# Patient Record
Sex: FEMALE | Race: WHITE | NOT HISPANIC OR LATINO | Employment: OTHER | URBAN - METROPOLITAN AREA
[De-identification: names, ages, dates, MRNs, and addresses within clinical notes are randomized per-mention and may not be internally consistent; named-entity substitution may affect disease eponyms.]

---

## 2018-08-24 ENCOUNTER — OFFICE VISIT (OUTPATIENT)
Dept: UROLOGY | Facility: CLINIC | Age: 83
End: 2018-08-24
Payer: COMMERCIAL

## 2018-08-24 VITALS
BODY MASS INDEX: 31.96 KG/M2 | HEIGHT: 65 IN | HEART RATE: 59 BPM | DIASTOLIC BLOOD PRESSURE: 72 MMHG | SYSTOLIC BLOOD PRESSURE: 112 MMHG | WEIGHT: 191.8 LBS

## 2018-08-24 DIAGNOSIS — R31.9 URINARY TRACT INFECTION WITH HEMATURIA, SITE UNSPECIFIED: Primary | ICD-10-CM

## 2018-08-24 DIAGNOSIS — N39.0 URINARY TRACT INFECTION WITH HEMATURIA, SITE UNSPECIFIED: Primary | ICD-10-CM

## 2018-08-24 LAB
SL AMB  POCT GLUCOSE, UA: NORMAL
SL AMB LEUKOCYTE ESTERASE,UA: NORMAL
SL AMB POCT BILIRUBIN,UA: NORMAL
SL AMB POCT BLOOD,UA: 50
SL AMB POCT CLARITY,UA: CLEAR
SL AMB POCT COLOR,UA: YELLOW
SL AMB POCT KETONES,UA: NORMAL
SL AMB POCT NITRITE,UA: NORMAL
SL AMB POCT PH,UA: 8
SL AMB POCT SPECIFIC GRAVITY,UA: 1
SL AMB POCT URINE PROTEIN: NORMAL
SL AMB POCT UROBILINOGEN: NORMAL

## 2018-08-24 PROCEDURE — 87147 CULTURE TYPE IMMUNOLOGIC: CPT | Performed by: UROLOGY

## 2018-08-24 PROCEDURE — 87086 URINE CULTURE/COLONY COUNT: CPT | Performed by: UROLOGY

## 2018-08-24 PROCEDURE — 81002 URINALYSIS NONAUTO W/O SCOPE: CPT | Performed by: UROLOGY

## 2018-08-24 PROCEDURE — 99203 OFFICE O/P NEW LOW 30 MIN: CPT | Performed by: UROLOGY

## 2018-08-24 RX ORDER — MIRTAZAPINE 15 MG/1
7.5 TABLET, FILM COATED ORAL
COMMUNITY
Start: 2018-07-14 | End: 2019-07-14

## 2018-08-24 RX ORDER — DORZOLAMIDE HYDROCHLORIDE AND TIMOLOL MALEATE 20; 5 MG/ML; MG/ML
SOLUTION/ DROPS OPHTHALMIC
COMMUNITY
Start: 2017-11-22

## 2018-08-24 RX ORDER — ZOLPIDEM TARTRATE 5 MG/1
TABLET ORAL
COMMUNITY
Start: 2018-06-27

## 2018-08-24 RX ORDER — BENAZEPRIL HYDROCHLORIDE 10 MG/1
TABLET ORAL
COMMUNITY
Start: 2018-06-30

## 2018-08-24 RX ORDER — TRAVOPROST OPHTHALMIC SOLUTION 0.04 MG/ML
SOLUTION OPHTHALMIC
COMMUNITY
Start: 2016-11-10

## 2018-08-24 RX ORDER — HYDROCHLOROTHIAZIDE 25 MG/1
TABLET ORAL
COMMUNITY
Start: 2018-04-16

## 2018-08-24 RX ORDER — DIPHENOXYLATE HYDROCHLORIDE AND ATROPINE SULFATE 2.5; .025 MG/1; MG/1
1 TABLET ORAL
COMMUNITY

## 2018-08-24 RX ORDER — LEVOTHYROXINE SODIUM 0.15 MG/1
TABLET ORAL
COMMUNITY
Start: 2018-08-09

## 2018-08-24 RX ORDER — FEXOFENADINE HCL 180 MG/1
180 TABLET ORAL
COMMUNITY

## 2018-08-24 RX ORDER — SERTRALINE HYDROCHLORIDE 100 MG/1
100 TABLET, FILM COATED ORAL
COMMUNITY
Start: 2018-06-27

## 2018-08-24 NOTE — PROGRESS NOTES
Progress Note - Urology  Dasha Loera 80 y o  female MRN: [id number]  Encounter: [id number]      Chief Complaint:   Chief Complaint   Patient presents with    Urinary Tract Infection     recurrent       HPI:   80-year-old female presents for evaluation of recurrent urinary tract infection  She is somewhat perplexed as she has been asymptomatic  After treatment she still has recurrent infection  Again, she is not symptomatic  These are voided urine specimens  Last specimen from July did revealed E coli on the voided specimen  She denies any urinary incontinence  She does wear pads for "safety"  Voiding pattern appears normal she goes every 3-4 hours  She does not have any bowel dysfunction  No diarrhea  She is not sexually active  She did have a per CT scan in July and this did not show any evidence of Urology pathology        MEDS:    Current Outpatient Prescriptions:     aspirin 81 MG tablet, Take 81 mg by mouth, Disp: , Rfl:     benazepril (LOTENSIN) 10 mg tablet, TAKE 1 TABLET BY MOUTH EVERY DAY, Disp: , Rfl:     brimonidine (ALPHAGAN P) 0 1 %, 1 drop in each eye twice a day, Disp: , Rfl:     dorzolamide-timolol (COSOPT) 22 3-6 8 MG/ML ophthalmic solution, , Disp: , Rfl:     fexofenadine (ALLEGRA) 180 MG tablet, Take 180 mg by mouth, Disp: , Rfl:     GLUCOSAMINE HCL PO, 1 daily, Disp: , Rfl:     hydrochlorothiazide (HYDRODIURIL) 25 mg tablet, TAKE 1 TABLET DAILY, Disp: , Rfl:     hydrocortisone 2 5 % cream, Apply topically, Disp: , Rfl:     levothyroxine 150 mcg tablet, TAKE 1 TABLET DAILY, Disp: , Rfl:     mirtazapine (REMERON) 15 mg tablet, Take 7 5 mg by mouth, Disp: , Rfl:     multivitamin (THERAGRAN) TABS, Take 1 tablet by mouth, Disp: , Rfl:     sertraline (ZOLOFT) 100 mg tablet, Take 100 mg by mouth, Disp: , Rfl:     travoprost (TRAVATAN Z) 0 004 % ophthalmic solution, INSTILL 1 DROP INTO BOTH EYES AT BEDTIME , Disp: , Rfl:     zolpidem (AMBIEN) 5 mg tablet, Half a tb at hs , Disp: , Rfl:       PMH:  Past Medical History:   Diagnosis Date    Hypertension     Thyroid trouble     Urinary tract infection          PSH  Past Surgical History:   Procedure Laterality Date    APPENDECTOMY      OVARY SURGERY      VEIN SURGERY  1960         ROS:  Review of Systems      Vitals:  Blood pressure 112/72, pulse 59, height 5' 5" (1 651 m), weight 87 kg (191 lb 12 8 oz)  Physical Exam:     General Appearance    Elderly female appearing her stated age  Cardiac   High rate regular no murmurs appreciated  Pulmonary   Chest is clear to percussion and auscultation  Abdomen   Protuberant, soft, nontender, no masses  No organomegaly  No bladder distention  Back    No CVA tenderness  Genitalia   Atrophic vaginitis no discharge  No caruncle  Extremities     Trace lower extremity edema  Neurologic   Grossly physiologic    Lab, Imaging and other studies:  Recent Results (from the past 672 hour(s))   POCT urine dip    Collection Time: 08/24/18  1:57 PM   Result Value Ref Range    LEUKOCYTE ESTERASE,UA ++      NITRITE,UA -     SL AMB POCT UROBILINOGEN -     SL AMB POCT URINE PROTEIN -      PH,UA 8      BLOOD,UA 50      SPECIFIC GRAVITY,UA 1 000      KETONES,UA -      BILIRUBIN,UA -     GLUCOSE, UA -      COLOR,UA yellow      CLARITY,UA clear            IMPRESSION:   1  History recurrent UTI    PLAN:   currently her cath urine specimen was clear at this time on collection  Her voided specimen did show leukocytes  I advised her not to aggressively treat any abnormal urines unless she is symptomatic    Or unless she has a cathed specimen obtained confirming UTI

## 2018-08-24 NOTE — LETTER
August 24, 2018     Vergie Leeper, [address] [address] [facility]    Patient: Vish Rios   YOB: 1934   Date of Visit: 8/24/2018       Dear Dr Meryle Reamer: Thank you for referring Vish Rios to me for evaluation  Below are my notes for this consultation  If you have questions, please do not hesitate to call me  I look forward to following your patient along with you  Sincerely,        Rosalba Lockwood MD        CC: No Recipients  Rosalba Lockwood MD  8/24/2018  3:27 PM  Sign at close encounter  Progress Note - Urology  Vish Rios 80 y o  female MRN: [id number]  Encounter: [id number]      Chief Complaint:   Chief Complaint   Patient presents with    Urinary Tract Infection     recurrent       HPI:   80-year-old female presents for evaluation of recurrent urinary tract infection  She is somewhat perplexed as she has been asymptomatic  After treatment she still has recurrent infection  Again, she is not symptomatic  These are voided urine specimens  Last specimen from July did revealed E coli on the voided specimen  She denies any urinary incontinence  She does wear pads for "safety"  Voiding pattern appears normal she goes every 3-4 hours  She does not have any bowel dysfunction  No diarrhea  She is not sexually active  She did have a per CT scan in July and this did not show any evidence of Urology pathology        MEDS:    Current Outpatient Prescriptions:     aspirin 81 MG tablet, Take 81 mg by mouth, Disp: , Rfl:     benazepril (LOTENSIN) 10 mg tablet, TAKE 1 TABLET BY MOUTH EVERY DAY, Disp: , Rfl:     brimonidine (ALPHAGAN P) 0 1 %, 1 drop in each eye twice a day, Disp: , Rfl:     dorzolamide-timolol (COSOPT) 22 3-6 8 MG/ML ophthalmic solution, , Disp: , Rfl:     fexofenadine (ALLEGRA) 180 MG tablet, Take 180 mg by mouth, Disp: , Rfl:     GLUCOSAMINE HCL PO, 1 daily, Disp: , Rfl:     hydrochlorothiazide (HYDRODIURIL) 25 mg tablet, TAKE 1 TABLET DAILY, Disp: , Rfl:     hydrocortisone 2 5 % cream, Apply topically, Disp: , Rfl:     levothyroxine 150 mcg tablet, TAKE 1 TABLET DAILY, Disp: , Rfl:     mirtazapine (REMERON) 15 mg tablet, Take 7 5 mg by mouth, Disp: , Rfl:     multivitamin (THERAGRAN) TABS, Take 1 tablet by mouth, Disp: , Rfl:     sertraline (ZOLOFT) 100 mg tablet, Take 100 mg by mouth, Disp: , Rfl:     travoprost (TRAVATAN Z) 0 004 % ophthalmic solution, INSTILL 1 DROP INTO BOTH EYES AT BEDTIME , Disp: , Rfl:     zolpidem (AMBIEN) 5 mg tablet, Half a tb at hs , Disp: , Rfl:       PMH:  Past Medical History:   Diagnosis Date    Hypertension     Thyroid trouble     Urinary tract infection          PSH  Past Surgical History:   Procedure Laterality Date    APPENDECTOMY      OVARY SURGERY      VEIN SURGERY  1960         ROS:  Review of Systems      Vitals:  Blood pressure 112/72, pulse 59, height 5' 5" (1 651 m), weight 87 kg (191 lb 12 8 oz)  Physical Exam:     General Appearance    Elderly female appearing her stated age  Cardiac   High rate regular no murmurs appreciated  Pulmonary   Chest is clear to percussion and auscultation  Abdomen   Protuberant, soft, nontender, no masses  No organomegaly  No bladder distention  Back    No CVA tenderness  Genitalia   Atrophic vaginitis no discharge  No caruncle  Extremities     Trace lower extremity edema  Neurologic   Grossly physiologic    Lab, Imaging and other studies:  Recent Results (from the past 672 hour(s))   POCT urine dip    Collection Time: 08/24/18  1:57 PM   Result Value Ref Range    LEUKOCYTE ESTERASE,UA ++      NITRITE,UA -     SL AMB POCT UROBILINOGEN -     SL AMB POCT URINE PROTEIN -      PH,UA 8      BLOOD,UA 50      SPECIFIC GRAVITY,UA 1 000      KETONES,UA -      BILIRUBIN,UA -     GLUCOSE, UA -      COLOR,UA yellow      CLARITY,UA clear            IMPRESSION:   1   History recurrent UTI    PLAN:   currently her cath urine specimen was clear at this time on collection  Her voided specimen did show leukocytes  I advised her not to aggressively treat any abnormal urines unless she is symptomatic    Or unless she has a cathed specimen obtained confirming UTI

## 2018-08-27 LAB — BACTERIA UR CULT: NORMAL

## 2023-12-08 ENCOUNTER — APPOINTMENT (OUTPATIENT)
Dept: LAB | Facility: IMAGING CENTER | Age: 88
End: 2023-12-08
Payer: COMMERCIAL

## 2023-12-08 DIAGNOSIS — E03.8 HYPOTHYROIDISM DUE TO FIBROUS INVASIVE THYROIDITIS: ICD-10-CM

## 2023-12-08 DIAGNOSIS — E06.5 HYPOTHYROIDISM DUE TO FIBROUS INVASIVE THYROIDITIS: ICD-10-CM

## 2023-12-08 DIAGNOSIS — E06.3 HASHIMOTO THYROIDITIS, FIBROUS VARIANT: ICD-10-CM

## 2023-12-08 LAB
T4 FREE SERPL-MCNC: 1.15 NG/DL (ref 0.61–1.12)
TSH SERPL DL<=0.05 MIU/L-ACNC: 0.4 UIU/ML (ref 0.45–4.5)

## 2023-12-08 PROCEDURE — 84439 ASSAY OF FREE THYROXINE: CPT

## 2023-12-08 PROCEDURE — 84443 ASSAY THYROID STIM HORMONE: CPT

## 2023-12-08 PROCEDURE — 36415 COLL VENOUS BLD VENIPUNCTURE: CPT
